# Patient Record
Sex: MALE | Race: WHITE | NOT HISPANIC OR LATINO | Employment: OTHER | URBAN - METROPOLITAN AREA
[De-identification: names, ages, dates, MRNs, and addresses within clinical notes are randomized per-mention and may not be internally consistent; named-entity substitution may affect disease eponyms.]

---

## 2023-04-26 ENCOUNTER — ANCILLARY PROCEDURE (OUTPATIENT)
Dept: GENERAL RADIOLOGY | Facility: CLINIC | Age: 48
End: 2023-04-26
Attending: PHYSICIAN ASSISTANT
Payer: COMMERCIAL

## 2023-04-26 ENCOUNTER — OFFICE VISIT (OUTPATIENT)
Dept: URGENT CARE | Facility: URGENT CARE | Age: 48
End: 2023-04-26
Payer: COMMERCIAL

## 2023-04-26 VITALS
OXYGEN SATURATION: 94 % | HEART RATE: 105 BPM | WEIGHT: 210 LBS | HEIGHT: 69 IN | DIASTOLIC BLOOD PRESSURE: 97 MMHG | RESPIRATION RATE: 14 BRPM | BODY MASS INDEX: 31.1 KG/M2 | TEMPERATURE: 97.4 F | SYSTOLIC BLOOD PRESSURE: 138 MMHG

## 2023-04-26 DIAGNOSIS — R20.2 COMPLAINT OF PARESTHESIA: ICD-10-CM

## 2023-04-26 DIAGNOSIS — R10.32 LEFT INGUINAL PAIN: Primary | ICD-10-CM

## 2023-04-26 LAB
ALBUMIN UR-MCNC: NEGATIVE MG/DL
APPEARANCE UR: CLEAR
BASOPHILS # BLD AUTO: 0 10E3/UL (ref 0–0.2)
BASOPHILS NFR BLD AUTO: 0 %
BILIRUB UR QL STRIP: NEGATIVE
COLOR UR AUTO: YELLOW
EOSINOPHIL # BLD AUTO: 0.1 10E3/UL (ref 0–0.7)
EOSINOPHIL NFR BLD AUTO: 1 %
ERYTHROCYTE [DISTWIDTH] IN BLOOD BY AUTOMATED COUNT: 13 % (ref 10–15)
GLUCOSE UR STRIP-MCNC: NEGATIVE MG/DL
HCT VFR BLD AUTO: 43.6 % (ref 40–53)
HGB BLD-MCNC: 14.7 G/DL (ref 13.3–17.7)
HGB UR QL STRIP: NEGATIVE
IMM GRANULOCYTES # BLD: 0 10E3/UL
IMM GRANULOCYTES NFR BLD: 0 %
KETONES UR STRIP-MCNC: NEGATIVE MG/DL
LEUKOCYTE ESTERASE UR QL STRIP: NEGATIVE
LYMPHOCYTES # BLD AUTO: 2.2 10E3/UL (ref 0.8–5.3)
LYMPHOCYTES NFR BLD AUTO: 23 %
MCH RBC QN AUTO: 31.7 PG (ref 26.5–33)
MCHC RBC AUTO-ENTMCNC: 33.7 G/DL (ref 31.5–36.5)
MCV RBC AUTO: 94 FL (ref 78–100)
MONOCYTES # BLD AUTO: 0.8 10E3/UL (ref 0–1.3)
MONOCYTES NFR BLD AUTO: 9 %
NEUTROPHILS # BLD AUTO: 6.4 10E3/UL (ref 1.6–8.3)
NEUTROPHILS NFR BLD AUTO: 68 %
NITRATE UR QL: NEGATIVE
PH UR STRIP: 5.5 [PH] (ref 5–7)
PLATELET # BLD AUTO: 248 10E3/UL (ref 150–450)
RBC # BLD AUTO: 4.63 10E6/UL (ref 4.4–5.9)
SP GR UR STRIP: 1.01 (ref 1–1.03)
UROBILINOGEN UR STRIP-ACNC: 0.2 E.U./DL
WBC # BLD AUTO: 9.5 10E3/UL (ref 4–11)

## 2023-04-26 PROCEDURE — 72100 X-RAY EXAM L-S SPINE 2/3 VWS: CPT | Mod: TC | Performed by: RADIOLOGY

## 2023-04-26 PROCEDURE — 36415 COLL VENOUS BLD VENIPUNCTURE: CPT

## 2023-04-26 PROCEDURE — 74019 RADEX ABDOMEN 2 VIEWS: CPT | Mod: TC | Performed by: RADIOLOGY

## 2023-04-26 PROCEDURE — 81003 URINALYSIS AUTO W/O SCOPE: CPT

## 2023-04-26 PROCEDURE — 99204 OFFICE O/P NEW MOD 45 MIN: CPT

## 2023-04-26 PROCEDURE — 85025 COMPLETE CBC W/AUTO DIFF WBC: CPT

## 2023-04-26 NOTE — PROGRESS NOTES
Left inguinal pain  - CBC with platelets and differential; Future  - XR Lumbar Spine 2/3 Views  - UA macro with reflex to Microscopic and Culture - Clinc Collect  - XR Abdomen 2 Views  - CBC with platelets and differential    Complaint of paresthesia  - CBC with platelets and differential; Future  - XR Lumbar Spine 2/3 Views  - UA macro with reflex to Microscopic and Culture - Clinc Collect  - XR Abdomen 2 Views  - CBC with platelets and differential    The etiology of the patient's symptoms is puzzling.  No hernia could be appreciated on physical exam.  It is unlikely that the patient is having a shingles flareup as there is no rash and CBC was normal.  Bowels appear to be without stool impaction or obstruction on the left-hand side.  Patient does not seem to have any evidence of renal stone.  It is probable that the patient pulled a pelvic muscle in this area.  I have advised him to use Tylenol and ibuprofen for pain relief.  Consider going to the ER in the next 48 to 72 hours if pain is worsening or does not improve.    Kevin Peters PA-C  [facility]    Subjective   47 year old who presents to clinic today for the following health issues:    Urgent Care       HPI     Left side deep pain in groin, x 5 days later tingling and burring in thigh with numbness x 10 days total. Patient states he has been taking 1 gram of Valtrex x 6 days (Patient has this for cold sores) to seek relief and tylenol. Patient denies any lumps or bumps in the area. Patient describes it as a deep throbbing pain with burning. Patient states that it radiates to the lower left back. Patient denies a rash or redness or warmth. Patient puts the pain on 5/10 scale.     Patient has had some constipation since starting the valtrex. Denies any urinary symptoms and denies pain in the penis or testicles.     Review of Systems   Review of Systems   See HPI    Objective    Temp: 97.4  F (36.3  C) Temp src: Rectal BP: (!) 138/97  Pulse: 105   Resp: 14 SpO2: 94 %       Physical Exam   Physical Exam  Constitutional:       General: He is not in acute distress.     Appearance: Normal appearance. He is normal weight. He is not ill-appearing, toxic-appearing or diaphoretic.   HENT:      Head: Normocephalic and atraumatic.   Cardiovascular:      Rate and Rhythm: Normal rate and regular rhythm.   Abdominal:      General: Abdomen is flat. Bowel sounds are normal. There is no distension.      Palpations: Abdomen is soft. There is no shifting dullness, fluid wave, hepatomegaly, splenomegaly, mass or pulsatile mass.      Tenderness: There is no abdominal tenderness. There is no right CVA tenderness, left CVA tenderness, guarding or rebound. Negative signs include Meadows's sign, Rovsing's sign, McBurney's sign, psoas sign and obturator sign.      Hernia: No hernia is present. There is no hernia in the umbilical area, left inguinal area or right inguinal area.   Genitourinary:     Pubic Area: No rash.    Musculoskeletal:      Cervical back: Normal range of motion.   Lymphadenopathy:      Cervical: No cervical adenopathy.      Lower Body: No right inguinal adenopathy. No left inguinal adenopathy.   Skin:     Findings: Rash is not crusting, macular, nodular, papular, purpuric, pustular, scaling, urticarial or vesicular.   Neurological:      General: No focal deficit present.      Mental Status: He is alert and oriented to person, place, and time. Mental status is at baseline.      Gait: Gait normal.   Psychiatric:         Mood and Affect: Mood normal.         Behavior: Behavior normal.         Thought Content: Thought content normal.         Judgment: Judgment normal.          Results for orders placed or performed in visit on 04/26/23 (from the past 24 hour(s))   UA macro with reflex to Microscopic and Culture - Clinc Collect    Specimen: Urine, Clean Catch   Result Value Ref Range    Color Urine Yellow Colorless, Straw, Light Yellow, Yellow    Appearance  Urine Clear Clear    Glucose Urine Negative Negative mg/dL    Bilirubin Urine Negative Negative    Ketones Urine Negative Negative mg/dL    Specific Gravity Urine 1.015 1.003 - 1.035    Blood Urine Negative Negative    pH Urine 5.5 5.0 - 7.0    Protein Albumin Urine Negative Negative mg/dL    Urobilinogen Urine 0.2 0.2, 1.0 E.U./dL    Nitrite Urine Negative Negative    Leukocyte Esterase Urine Negative Negative    Narrative    Microscopic not indicated   XR Lumbar Spine 2/3 Views    Narrative    XR LUMBAR SPINE 2/3 VIEWS 4/26/2023 12:14 PM    INDICATION: Left inguinal pain; Complaint of paresthesia  COMPARISON: None      Impression    IMPRESSION: 5 lumbar vertebral bodies. Slight convex right lumbar  curvature. No subluxation. Normal vertebral body heights, no fracture.  Preserved interspaces. Minor lower lumbar spine facet arthropathy.    ISABEL MIGUEL MD         SYSTEM ID:  [id number]   XR Abdomen 2 Views    Narrative    XR ABDOMEN 2 VIEWS 4/26/2023 12:15 PM    HISTORY: Left inguinal pain; Complaint of paresthesia    COMPARISON: None.      Impression    IMPRESSION: Mild stool noted throughout the colon. Nonobstructive  bowel gas pattern.    MARCELO BROUSSARD MD         SYSTEM ID:  [id number]   CBC with platelets and differential    Narrative    The following orders were created for panel order CBC with platelets and differential.  Procedure                               Abnormality         Status                     ---------                               -----------         ------                     CBC with platelets and d...[[id number]]                      Final result                 Please view results for these tests on the individual orders.   CBC with platelets and differential   Result Value Ref Range    WBC Count 9.5 4.0 - 11.0 10e3/uL    RBC Count 4.63 4.40 - 5.90 10e6/uL    Hemoglobin 14.7 13.3 - 17.7 g/dL    Hematocrit 43.6 40.0 - 53.0 %    MCV 94 78 - 100 fL    MCH 31.7 26.5 - 33.0 pg    MCHC 33.7  31.5 - 36.5 g/dL    RDW 13.0 10.0 - 15.0 %    Platelet Count 248 150 - 450 10e3/uL    % Neutrophils 68 %    % Lymphocytes 23 %    % Monocytes 9 %    % Eosinophils 1 %    % Basophils 0 %    % Immature Granulocytes 0 %    Absolute Neutrophils 6.4 1.6 - 8.3 10e3/uL    Absolute Lymphocytes 2.2 0.8 - 5.3 10e3/uL    Absolute Monocytes 0.8 0.0 - 1.3 10e3/uL    Absolute Eosinophils 0.1 0.0 - 0.7 10e3/uL    Absolute Basophils 0.0 0.0 - 0.2 10e3/uL    Absolute Immature Granulocytes 0.0 <=0.4 10e3/uL

## 2023-08-13 ENCOUNTER — HEALTH MAINTENANCE LETTER (OUTPATIENT)
Age: 48
End: 2023-08-13

## 2023-11-06 ENCOUNTER — HOSPITAL ENCOUNTER (INPATIENT)
Facility: CLINIC | Age: 48
LOS: 2 days | Discharge: HOME | End: 2023-11-08
Attending: EMERGENCY MEDICINE | Admitting: INTERNAL MEDICINE
Payer: COMMERCIAL

## 2023-11-06 ENCOUNTER — APPOINTMENT (OUTPATIENT)
Dept: CT IMAGING | Facility: CLINIC | Age: 48
End: 2023-11-06
Attending: EMERGENCY MEDICINE
Payer: COMMERCIAL

## 2023-11-06 DIAGNOSIS — A41.9 SEPSIS WITHOUT ACUTE ORGAN DYSFUNCTION, DUE TO UNSPECIFIED ORGANISM (H): ICD-10-CM

## 2023-11-06 DIAGNOSIS — K61.0 PERIANAL ABSCESS: ICD-10-CM

## 2023-11-06 LAB
ALBUMIN SERPL BCG-MCNC: 4.7 G/DL (ref 3.5–5.2)
ALBUMIN UR-MCNC: 70 MG/DL
ALP SERPL-CCNC: 78 U/L (ref 40–129)
ALT SERPL W P-5'-P-CCNC: 73 U/L (ref 0–70)
ANION GAP SERPL CALCULATED.3IONS-SCNC: 21 MMOL/L (ref 7–15)
APPEARANCE UR: CLEAR
AST SERPL W P-5'-P-CCNC: 42 U/L (ref 0–45)
BACTERIA #/AREA URNS HPF: ABNORMAL /HPF
BASOPHILS # BLD AUTO: 0.1 10E3/UL (ref 0–0.2)
BASOPHILS NFR BLD AUTO: 0 %
BILIRUB SERPL-MCNC: 1.2 MG/DL
BILIRUB UR QL STRIP: NEGATIVE
BUN SERPL-MCNC: 10.2 MG/DL (ref 6–20)
CALCIUM SERPL-MCNC: 9.5 MG/DL (ref 8.6–10)
CHLORIDE SERPL-SCNC: 94 MMOL/L (ref 98–107)
COLOR UR AUTO: YELLOW
CREAT SERPL-MCNC: 1 MG/DL (ref 0.67–1.17)
DEPRECATED HCO3 PLAS-SCNC: 21 MMOL/L (ref 22–29)
EGFRCR SERPLBLD CKD-EPI 2021: >90 ML/MIN/1.73M2
EOSINOPHIL # BLD AUTO: 0 10E3/UL (ref 0–0.7)
EOSINOPHIL NFR BLD AUTO: 0 %
ERYTHROCYTE [DISTWIDTH] IN BLOOD BY AUTOMATED COUNT: 13.2 % (ref 10–15)
GLUCOSE SERPL-MCNC: 114 MG/DL (ref 70–99)
GLUCOSE UR STRIP-MCNC: NEGATIVE MG/DL
HCO3 BLDV-SCNC: 24 MMOL/L (ref 21–28)
HCT VFR BLD AUTO: 43.1 % (ref 40–53)
HGB BLD-MCNC: 14.3 G/DL (ref 13.3–17.7)
HGB UR QL STRIP: NEGATIVE
HOLD SPECIMEN: NORMAL
HOLD SPECIMEN: NORMAL
IMM GRANULOCYTES # BLD: 0.1 10E3/UL
IMM GRANULOCYTES NFR BLD: 0 %
KETONES UR STRIP-MCNC: 100 MG/DL
LACTATE BLD-SCNC: 1.3 MMOL/L
LACTATE SERPL-SCNC: 1.7 MMOL/L (ref 0.7–2)
LEUKOCYTE ESTERASE UR QL STRIP: NEGATIVE
LYMPHOCYTES # BLD AUTO: 0.8 10E3/UL (ref 0.8–5.3)
LYMPHOCYTES NFR BLD AUTO: 5 %
MCH RBC QN AUTO: 31.4 PG (ref 26.5–33)
MCHC RBC AUTO-ENTMCNC: 33.2 G/DL (ref 31.5–36.5)
MCV RBC AUTO: 95 FL (ref 78–100)
MONOCYTES # BLD AUTO: 1.3 10E3/UL (ref 0–1.3)
MONOCYTES NFR BLD AUTO: 8 %
MUCOUS THREADS #/AREA URNS LPF: PRESENT /LPF
NEUTROPHILS # BLD AUTO: 14.5 10E3/UL (ref 1.6–8.3)
NEUTROPHILS NFR BLD AUTO: 87 %
NITRATE UR QL: NEGATIVE
NRBC # BLD AUTO: 0 10E3/UL
NRBC BLD AUTO-RTO: 0 /100
PCO2 BLDV: 33 MM HG (ref 40–50)
PH BLDV: 7.47 [PH] (ref 7.32–7.43)
PH UR STRIP: 5.5 [PH] (ref 5–7)
PLATELET # BLD AUTO: 270 10E3/UL (ref 150–450)
PO2 BLDV: 26 MM HG (ref 25–47)
POTASSIUM SERPL-SCNC: 3.6 MMOL/L (ref 3.4–5.3)
PROT SERPL-MCNC: 8.2 G/DL (ref 6.4–8.3)
RBC # BLD AUTO: 4.55 10E6/UL (ref 4.4–5.9)
RBC URINE: <1 /HPF
SAO2 % BLDV: 53 % (ref 94–100)
SODIUM SERPL-SCNC: 136 MMOL/L (ref 135–145)
SP GR UR STRIP: 1.02 (ref 1–1.03)
UROBILINOGEN UR STRIP-MCNC: NORMAL MG/DL
WBC # BLD AUTO: 16.7 10E3/UL (ref 4–11)
WBC URINE: 4 /HPF

## 2023-11-06 PROCEDURE — 250N000011 HC RX IP 250 OP 636: Performed by: EMERGENCY MEDICINE

## 2023-11-06 PROCEDURE — 83605 ASSAY OF LACTIC ACID: CPT | Performed by: EMERGENCY MEDICINE

## 2023-11-06 PROCEDURE — 85025 COMPLETE CBC W/AUTO DIFF WBC: CPT | Performed by: EMERGENCY MEDICINE

## 2023-11-06 PROCEDURE — 96365 THER/PROPH/DIAG IV INF INIT: CPT | Mod: 59

## 2023-11-06 PROCEDURE — 74177 CT ABD & PELVIS W/CONTRAST: CPT

## 2023-11-06 PROCEDURE — 120N000001 HC R&B MED SURG/OB

## 2023-11-06 PROCEDURE — 87205 SMEAR GRAM STAIN: CPT | Performed by: EMERGENCY MEDICINE

## 2023-11-06 PROCEDURE — 87077 CULTURE AEROBIC IDENTIFY: CPT | Performed by: EMERGENCY MEDICINE

## 2023-11-06 PROCEDURE — 87040 BLOOD CULTURE FOR BACTERIA: CPT | Performed by: EMERGENCY MEDICINE

## 2023-11-06 PROCEDURE — 96361 HYDRATE IV INFUSION ADD-ON: CPT

## 2023-11-06 PROCEDURE — 258N000003 HC RX IP 258 OP 636: Performed by: EMERGENCY MEDICINE

## 2023-11-06 PROCEDURE — 81001 URINALYSIS AUTO W/SCOPE: CPT | Performed by: EMERGENCY MEDICINE

## 2023-11-06 PROCEDURE — 36415 COLL VENOUS BLD VENIPUNCTURE: CPT | Performed by: EMERGENCY MEDICINE

## 2023-11-06 PROCEDURE — 82803 BLOOD GASES ANY COMBINATION: CPT

## 2023-11-06 PROCEDURE — 250N000013 HC RX MED GY IP 250 OP 250 PS 637: Performed by: EMERGENCY MEDICINE

## 2023-11-06 PROCEDURE — 99223 1ST HOSP IP/OBS HIGH 75: CPT | Mod: AI | Performed by: INTERNAL MEDICINE

## 2023-11-06 PROCEDURE — 99285 EMERGENCY DEPT VISIT HI MDM: CPT | Mod: 25

## 2023-11-06 PROCEDURE — 80053 COMPREHEN METABOLIC PANEL: CPT | Performed by: EMERGENCY MEDICINE

## 2023-11-06 PROCEDURE — 250N000009 HC RX 250: Performed by: EMERGENCY MEDICINE

## 2023-11-06 PROCEDURE — 93005 ELECTROCARDIOGRAM TRACING: CPT

## 2023-11-06 PROCEDURE — 96375 TX/PRO/DX INJ NEW DRUG ADDON: CPT

## 2023-11-06 RX ORDER — LORAZEPAM 2 MG/ML
1 INJECTION INTRAMUSCULAR ONCE
Status: COMPLETED | OUTPATIENT
Start: 2023-11-06 | End: 2023-11-06

## 2023-11-06 RX ORDER — ACETAMINOPHEN 500 MG
1000 TABLET ORAL ONCE
Status: COMPLETED | OUTPATIENT
Start: 2023-11-06 | End: 2023-11-06

## 2023-11-06 RX ORDER — AMPICILLIN AND SULBACTAM 2; 1 G/1; G/1
3 INJECTION, POWDER, FOR SOLUTION INTRAMUSCULAR; INTRAVENOUS EVERY 6 HOURS
Status: DISCONTINUED | OUTPATIENT
Start: 2023-11-07 | End: 2023-11-08

## 2023-11-06 RX ORDER — FENTANYL CITRATE 50 UG/ML
100 INJECTION, SOLUTION INTRAMUSCULAR; INTRAVENOUS
Status: DISCONTINUED | OUTPATIENT
Start: 2023-11-06 | End: 2023-11-08 | Stop reason: HOSPADM

## 2023-11-06 RX ORDER — LORAZEPAM 1 MG/1
1 TABLET ORAL ONCE
Status: DISCONTINUED | OUTPATIENT
Start: 2023-11-06 | End: 2023-11-06

## 2023-11-06 RX ORDER — PIPERACILLIN SODIUM, TAZOBACTAM SODIUM 4; .5 G/20ML; G/20ML
4.5 INJECTION, POWDER, LYOPHILIZED, FOR SOLUTION INTRAVENOUS ONCE
Status: COMPLETED | OUTPATIENT
Start: 2023-11-06 | End: 2023-11-06

## 2023-11-06 RX ORDER — HYDROMORPHONE HYDROCHLORIDE 1 MG/ML
0.5 INJECTION, SOLUTION INTRAMUSCULAR; INTRAVENOUS; SUBCUTANEOUS EVERY 30 MIN PRN
Status: DISCONTINUED | OUTPATIENT
Start: 2023-11-06 | End: 2023-11-07

## 2023-11-06 RX ORDER — IOPAMIDOL 755 MG/ML
111 INJECTION, SOLUTION INTRAVASCULAR ONCE
Status: COMPLETED | OUTPATIENT
Start: 2023-11-06 | End: 2023-11-06

## 2023-11-06 RX ORDER — PIPERACILLIN SODIUM, TAZOBACTAM SODIUM 4; .5 G/20ML; G/20ML
4.5 INJECTION, POWDER, LYOPHILIZED, FOR SOLUTION INTRAVENOUS EVERY 6 HOURS
Status: CANCELLED | OUTPATIENT
Start: 2023-11-06

## 2023-11-06 RX ADMIN — SODIUM CHLORIDE, POTASSIUM CHLORIDE, SODIUM LACTATE AND CALCIUM CHLORIDE 1000 ML: 600; 310; 30; 20 INJECTION, SOLUTION INTRAVENOUS at 18:11

## 2023-11-06 RX ADMIN — IOPAMIDOL 111 ML: 755 INJECTION, SOLUTION INTRAVENOUS at 19:31

## 2023-11-06 RX ADMIN — SODIUM CHLORIDE, POTASSIUM CHLORIDE, SODIUM LACTATE AND CALCIUM CHLORIDE 1000 ML: 600; 310; 30; 20 INJECTION, SOLUTION INTRAVENOUS at 23:16

## 2023-11-06 RX ADMIN — LORAZEPAM 1 MG: 2 INJECTION INTRAMUSCULAR; INTRAVENOUS at 21:50

## 2023-11-06 RX ADMIN — HYDROMORPHONE HYDROCHLORIDE 0.5 MG: 1 INJECTION, SOLUTION INTRAMUSCULAR; INTRAVENOUS; SUBCUTANEOUS at 18:41

## 2023-11-06 RX ADMIN — SODIUM CHLORIDE 72 ML: 9 INJECTION, SOLUTION INTRAVENOUS at 19:31

## 2023-11-06 RX ADMIN — PIPERACILLIN AND TAZOBACTAM 4.5 G: 4; .5 INJECTION, POWDER, FOR SOLUTION INTRAVENOUS at 18:15

## 2023-11-06 RX ADMIN — ACETAMINOPHEN 1000 MG: 500 TABLET, FILM COATED ORAL at 18:39

## 2023-11-06 ASSESSMENT — ACTIVITIES OF DAILY LIVING (ADL)
ADLS_ACUITY_SCORE: 35

## 2023-11-06 NOTE — ED PROVIDER NOTES
"  History     Chief Complaint:  Rectal pain     HPI   Dagoberto Almeida is a 48 year old male who presents from  for further evaluation of a perirectal abscess. He states he has been experiencing rectal pain for the past 5 days and has been taking OTC suppositories, creams, and wipes with no relief. He felt a continual worsening of pressure and bulging in his rectum. Last night, he had a fever of 103. Today, he still had a fever and he went to  and was found to have a perirectal abscess. He has been slightly nauseated and dehydrated, but denies vomiting. His urine is dark. He has been having pain with bowel movements and has been intentionally eating soft foods and smoothies to keep his stool soft. He denies black stools. He endorses tobacco and alcohol use.      Independent Historian:   None - Patient Only    Review of External Notes:   See MDM    Allergies:  The patient has no known allergies.     Medications:  The patient is currently on no regular medications.    Past Medical History:     The patient denies past medical history.      Past Surgical History:    Shoulder surgery  Pyloric stenosis repair  [place] teeth extraction    Physical Exam   Patient Vitals for the past 24 hrs:   BP Temp Temp src Pulse Resp SpO2 Height Weight   11/06/23 2100 131/83 -- -- 102 15 94 % -- --   11/06/23 2034 123/79 -- -- 110 26 96 % -- --   11/06/23 1928 -- -- -- 116 16 96 % -- --   11/06/23 1910 -- -- -- -- 20 95 % -- --   11/06/23 1900 136/72 -- -- (!) 121 -- 99 % -- --   11/06/23 1841 (!) 135/90 -- -- -- -- 98 % -- --   11/06/23 1730 (!) 133/96 -- -- (!) 133 20 100 % -- --   11/06/23 1714 (!) 145/92 (!) 100.6  F (38.1  C) Oral (!) 135 20 100 % 1.753 m (5' 9\") 99.8 kg (220 lb)        Physical Exam  Constitutional: Well developed, nontox appearance  Head: Atraumatic.   Mouth/Throat: Oropharynx is clear and moist.   Neck:  no stridor  Eyes: no scleral icterus  Cardiovascular: Regular tachycardia, 2+ R radial " pulses  Pulmonary/Chest: nml resp effort  Abdominal: ND, soft, NT, no rebound or guarding   Rectal: Posterior swelling and tenderness at approximately 6:00 perianally, no perineal tenderness or induration  : no CVA tenderness bilat  Ext: Warm, well perfused, no edema  Neurological: A&O, symmetric facies, moves ext x4  Skin: Skin is warm and dry.   Psychiatric: Behavior is normal. Thought content normal.   Nursing note and vitals reviewed.    Emergency Department Course   ECG  ECG taken at 1723, ECG read at 1727  Sinus tachycardia  Possible left atrial enlargement  Borderline ECG   No priors  Rate 133 bpm. IA interval 152 ms. QRS duration 70 ms. QT/QTc 274/407 ms. P-R-T axes 47 -14 46.     Imaging:  CT Abdomen Pelvis w Contrast   Final Result   IMPRESSION:    1.  Rim-enhancing fluid collection posterior to the distal anal canal concerning for perianal abscess. No fistula visualized, although MRI is more sensitive for detection and characterization.      2.  Hepatic steatosis.      POC US SOFT TISSUE    (Results Pending)        Laboratory:  Labs Ordered and Resulted from Time of ED Arrival to Time of ED Departure   COMPREHENSIVE METABOLIC PANEL - Abnormal       Result Value    Sodium 136      Potassium 3.6      Carbon Dioxide (CO2) 21 (*)     Anion Gap 21 (*)     Urea Nitrogen 10.2      Creatinine 1.00      GFR Estimate >90      Calcium 9.5      Chloride 94 (*)     Glucose 114 (*)     Alkaline Phosphatase 78      AST 42      ALT 73 (*)     Protein Total 8.2      Albumin 4.7      Bilirubin Total 1.2     ROUTINE UA WITH MICROSCOPIC - Abnormal    Color Urine Yellow      Appearance Urine Clear      Glucose Urine Negative      Bilirubin Urine Negative      Ketones Urine 100 (*)     Specific Gravity Urine 1.024      Blood Urine Negative      pH Urine 5.5      Protein Albumin Urine 70 (*)     Urobilinogen Urine Normal      Nitrite Urine Negative      Leukocyte Esterase Urine Negative      Bacteria Urine Few (*)     Mucus  Urine Present (*)     RBC Urine <1      WBC Urine 4     CBC WITH PLATELETS AND DIFFERENTIAL - Abnormal    WBC Count 16.7 (*)     RBC Count 4.55      Hemoglobin 14.3      Hematocrit 43.1      MCV 95      MCH 31.4      MCHC 33.2      RDW 13.2      Platelet Count 270      % Neutrophils 87      % Lymphocytes 5      % Monocytes 8      % Eosinophils 0      % Basophils 0      % Immature Granulocytes 0      NRBCs per 100 WBC 0      Absolute Neutrophils 14.5 (*)     Absolute Lymphocytes 0.8      Absolute Monocytes 1.3      Absolute Eosinophils 0.0      Absolute Basophils 0.1      Absolute Immature Granulocytes 0.1      Absolute NRBCs 0.0     ISTAT GASES LACTATE VENOUS POCT - Abnormal    Lactic Acid POCT 1.3      Bicarbonate Venous POCT 24      O2 Sat, Venous POCT 53 (*)     pCO2 Venous POCT 33 (*)     pH Venous POCT 7.47 (*)     pO2 Venous POCT 26     LACTIC ACID WHOLE BLOOD - Normal    Lactic Acid 1.7     BLOOD CULTURE   BLOOD CULTURE   AEROBIC BACTERIAL CULTURE ROUTINE          Emergency Department Course & Assessments:    Interventions:  Medications   HYDROmorphone (PF) (DILAUDID) injection 0.5 mg (0.5 mg Intravenous $Given 11/6/23 1841)   fentaNYL (PF) (SUBLIMAZE) injection 100 mcg (has no administration in time range)   lactated ringers BOLUS 1,000 mL (has no administration in time range)   lactated ringers BOLUS 1,000 mL (0 mLs Intravenous Stopped 11/6/23 1927)   piperacillin-tazobactam (ZOSYN) 4.5 g vial to attach to  mL bag (0 g Intravenous Stopped 11/6/23 1927)   acetaminophen (TYLENOL) tablet 1,000 mg (1,000 mg Oral $Given 11/6/23 1839)   Saline (72 mLs As instructed $Given 11/6/23 1931)   iopamidol (ISOVUE-370) solution 111 mL (111 mLs Intravenous $Given 11/6/23 1931)   LORazepam (ATIVAN) injection 1 mg (1 mg Intravenous $Given 11/6/23 2150)        Assessments:  1735 I obtained history and examined the patient, as noted above.   2140 I rechecked and updated the patient.  2217 I rechecked and updated the  patient.    Independent Interpretation (X-rays, CTs, rhythm strip):  See MDM    Consultations/Discussion of Management or Tests:   I spoke with Dr. Orozco from the hospitalist service regarding the patient's presentation, findings here in the ED, and plan of care.     Social Determinants of Health affecting care:   See MDM    Disposition:  The patient was admitted to the hospital under the care of Dr. Orozco.     Impression & Plan    CMS Diagnoses: None    Medical Decision Makin year old male presenting w/ perianal pain, swelling and tenderness     Social determinants affecting patient's health include: Patient Dors is alcohol and tobacco use increasing risk for complications associated with both     I reviewed medical records from urgent care office visit from today, 2023     DDx includes perirectal abscess, perianal abscess, sepsis, external hemorrhoid although unlikely given physical exam.   Labs significant for leukocytosis, normal lactic acid level.  Patient meets criteria for sepsis.  Blood cultures and Zosyn ordered for treatment.  Given concern for perirectal abscess given physical exam and sepsis, CT ordered for further evaluation.  Imaging sig for findings consistent with perianal abscess.  Reevaluate the patient with plan to I&D in the emergency department.  Upon reevaluation, the abscess had spontaneously ruptured.  A culture was ordered.  Given the patient meets sepsis criteria, he was admitted to the hospitalist service for further evaluation management.  Patient counseled on all results, disposition and diagnosis.  They are understanding and agreeable to plan. Patient admitted in guarded condition.      Diagnosis:    ICD-10-CM    1. Perianal abscess  K61.0       2. Sepsis without acute organ dysfunction, due to unspecified organism (H)  A41.9              Scribe Disclosure:  Avinash JOHNSON, am serving as a scribe at 5:32 PM on 2023 to document services personally performed by  Raymond Mcgowan MD based on my observations and the provider's statements to me.   11/6/2023   Raymond Mcgowan MD Vaughn, Christopher E, MD  11/06/23 5572

## 2023-11-06 NOTE — ED TRIAGE NOTES
Patient presents to ER after being referred by urgent care. Patient reports that for the last 5 days at home he has been managing pain with OTC for hemorrhoids but was unsuccessful. Last night developed a fever of 103 at home. Went to urgent care this morning and had a fever, sent for evaluation of perirectal abscess. Patient is tachy, diaphoretic and pale in triage.      Triage Assessment (Adult)       Row Name 11/06/23 1716          Triage Assessment    Airway WDL WDL        Respiratory WDL    Respiratory WDL WDL        Skin Circulation/Temperature WDL    Skin Circulation/Temperature WDL X;temperature     Skin Temperature warm        Cardiac WDL    Cardiac WDL X;rhythm     Pulse Rate & Regularity tachycardic        Peripheral/Neurovascular WDL    Peripheral Neurovascular WDL WDL        Cognitive/Neuro/Behavioral WDL    Cognitive/Neuro/Behavioral WDL WDL

## 2023-11-06 NOTE — ED NOTES
Rapid Assessment Note    History:   Dagoberto Almeida is a 48 year old male who presents from urgent care due to tachycardia, fever, worsening perianal pain and pressure.  Symptoms started over and been worsening over the last 5 days.  Had shaking chills last night with max temp of 103.  Thought he was having hemorrhoids and has been trying over-the-counter treatment for the last several days.  No history of it.  Has not had blood in his stool.  Was able to have a bowel movement this morning.  Is having increasing difficulty urinating.  Has not urinated since 10 AM.  States has been drinking 60 to 80 ounces of water a day.    Exam:   General:  Alert, interactive  Cardiovascular:  Well perfused  Lungs:  No respiratory distress, no accessory muscle use  Neuro:  Moving all 4 extremities  Skin:  Warm, dry  Psych:  Normal affect  GI: Perianal space not visualized in the intake    Plan of Care:   I evaluated the patient and developed an initial plan of care. I discussed this plan and explained that I, or one of my partners, would be returning to complete the evaluation.     Sepsis protocol ordered to the CT abdomen and pelvis.  Is being immediately roomed.     Morelia Yoder MD  11/06/23 [id number]

## 2023-11-07 LAB
ANION GAP SERPL CALCULATED.3IONS-SCNC: 13 MMOL/L (ref 7–15)
ATRIAL RATE - MUSE: 133 BPM
BUN SERPL-MCNC: 8.9 MG/DL (ref 6–20)
CALCIUM SERPL-MCNC: 9.1 MG/DL (ref 8.6–10)
CHLORIDE SERPL-SCNC: 103 MMOL/L (ref 98–107)
CREAT SERPL-MCNC: 0.84 MG/DL (ref 0.67–1.17)
DEPRECATED HCO3 PLAS-SCNC: 23 MMOL/L (ref 22–29)
DIASTOLIC BLOOD PRESSURE - MUSE: NORMAL MMHG
EGFRCR SERPLBLD CKD-EPI 2021: >90 ML/MIN/1.73M2
ERYTHROCYTE [DISTWIDTH] IN BLOOD BY AUTOMATED COUNT: 13 % (ref 10–15)
GLUCOSE SERPL-MCNC: 92 MG/DL (ref 70–99)
HCT VFR BLD AUTO: 39.2 % (ref 40–53)
HGB BLD-MCNC: 12.9 G/DL (ref 13.3–17.7)
INTERPRETATION ECG - MUSE: NORMAL
MCH RBC QN AUTO: 31 PG (ref 26.5–33)
MCHC RBC AUTO-ENTMCNC: 32.9 G/DL (ref 31.5–36.5)
MCV RBC AUTO: 94 FL (ref 78–100)
P AXIS - MUSE: 47 DEGREES
PLATELET # BLD AUTO: 265 10E3/UL (ref 150–450)
POTASSIUM SERPL-SCNC: 3.5 MMOL/L (ref 3.4–5.3)
PR INTERVAL - MUSE: 152 MS
QRS DURATION - MUSE: 70 MS
QT - MUSE: 274 MS
QTC - MUSE: 407 MS
R AXIS - MUSE: -14 DEGREES
RBC # BLD AUTO: 4.16 10E6/UL (ref 4.4–5.9)
SODIUM SERPL-SCNC: 139 MMOL/L (ref 135–145)
SYSTOLIC BLOOD PRESSURE - MUSE: NORMAL MMHG
T AXIS - MUSE: 46 DEGREES
VENTRICULAR RATE- MUSE: 133 BPM
WBC # BLD AUTO: 12.6 10E3/UL (ref 4–11)

## 2023-11-07 PROCEDURE — 80048 BASIC METABOLIC PNL TOTAL CA: CPT | Performed by: INTERNAL MEDICINE

## 2023-11-07 PROCEDURE — 250N000011 HC RX IP 250 OP 636: Performed by: INTERNAL MEDICINE

## 2023-11-07 PROCEDURE — 258N000003 HC RX IP 258 OP 636: Performed by: INTERNAL MEDICINE

## 2023-11-07 PROCEDURE — 85027 COMPLETE CBC AUTOMATED: CPT | Performed by: INTERNAL MEDICINE

## 2023-11-07 PROCEDURE — 96367 TX/PROPH/DG ADDL SEQ IV INF: CPT

## 2023-11-07 PROCEDURE — 99232 SBSQ HOSP IP/OBS MODERATE 35: CPT | Performed by: INTERNAL MEDICINE

## 2023-11-07 PROCEDURE — 36415 COLL VENOUS BLD VENIPUNCTURE: CPT | Performed by: INTERNAL MEDICINE

## 2023-11-07 PROCEDURE — 120N000001 HC R&B MED SURG/OB

## 2023-11-07 PROCEDURE — 96366 THER/PROPH/DIAG IV INF ADDON: CPT

## 2023-11-07 PROCEDURE — 250N000013 HC RX MED GY IP 250 OP 250 PS 637: Performed by: INTERNAL MEDICINE

## 2023-11-07 PROCEDURE — 96361 HYDRATE IV INFUSION ADD-ON: CPT

## 2023-11-07 RX ORDER — HYDROMORPHONE HCL IN WATER/PF 6 MG/30 ML
0.4 PATIENT CONTROLLED ANALGESIA SYRINGE INTRAVENOUS
Status: DISCONTINUED | OUTPATIENT
Start: 2023-11-07 | End: 2023-11-08 | Stop reason: HOSPADM

## 2023-11-07 RX ORDER — OXYCODONE HYDROCHLORIDE 5 MG/1
5 TABLET ORAL EVERY 4 HOURS PRN
Status: DISCONTINUED | OUTPATIENT
Start: 2023-11-07 | End: 2023-11-08 | Stop reason: HOSPADM

## 2023-11-07 RX ORDER — ACETAMINOPHEN 650 MG/1
650 SUPPOSITORY RECTAL EVERY 6 HOURS PRN
Status: DISCONTINUED | OUTPATIENT
Start: 2023-11-07 | End: 2023-11-08 | Stop reason: HOSPADM

## 2023-11-07 RX ORDER — HYDROMORPHONE HCL IN WATER/PF 6 MG/30 ML
0.2 PATIENT CONTROLLED ANALGESIA SYRINGE INTRAVENOUS
Status: DISCONTINUED | OUTPATIENT
Start: 2023-11-07 | End: 2023-11-08 | Stop reason: HOSPADM

## 2023-11-07 RX ORDER — ONDANSETRON 4 MG/1
4 TABLET, ORALLY DISINTEGRATING ORAL EVERY 6 HOURS PRN
Status: DISCONTINUED | OUTPATIENT
Start: 2023-11-07 | End: 2023-11-08 | Stop reason: HOSPADM

## 2023-11-07 RX ORDER — ONDANSETRON 2 MG/ML
4 INJECTION INTRAMUSCULAR; INTRAVENOUS EVERY 6 HOURS PRN
Status: DISCONTINUED | OUTPATIENT
Start: 2023-11-07 | End: 2023-11-08 | Stop reason: HOSPADM

## 2023-11-07 RX ORDER — SODIUM CHLORIDE 9 MG/ML
INJECTION, SOLUTION INTRAVENOUS CONTINUOUS
Status: ACTIVE | OUTPATIENT
Start: 2023-11-07 | End: 2023-11-08

## 2023-11-07 RX ORDER — ACETAMINOPHEN 325 MG/1
650 TABLET ORAL EVERY 6 HOURS PRN
Status: DISCONTINUED | OUTPATIENT
Start: 2023-11-07 | End: 2023-11-08 | Stop reason: HOSPADM

## 2023-11-07 RX ORDER — PROCHLORPERAZINE 25 MG
25 SUPPOSITORY, RECTAL RECTAL EVERY 12 HOURS PRN
Status: DISCONTINUED | OUTPATIENT
Start: 2023-11-07 | End: 2023-11-08 | Stop reason: HOSPADM

## 2023-11-07 RX ORDER — SODIUM CHLORIDE 9 MG/ML
INJECTION, SOLUTION INTRAVENOUS CONTINUOUS
Status: DISCONTINUED | OUTPATIENT
Start: 2023-11-07 | End: 2023-11-07

## 2023-11-07 RX ORDER — PROCHLORPERAZINE MALEATE 10 MG
10 TABLET ORAL EVERY 6 HOURS PRN
Status: DISCONTINUED | OUTPATIENT
Start: 2023-11-07 | End: 2023-11-08 | Stop reason: HOSPADM

## 2023-11-07 RX ADMIN — AMPICILLIN SODIUM AND SULBACTAM SODIUM 3 G: 2; 1 INJECTION, POWDER, FOR SOLUTION INTRAMUSCULAR; INTRAVENOUS at 12:37

## 2023-11-07 RX ADMIN — SODIUM CHLORIDE: 9 INJECTION, SOLUTION INTRAVENOUS at 03:48

## 2023-11-07 RX ADMIN — AMPICILLIN SODIUM AND SULBACTAM SODIUM 3 G: 2; 1 INJECTION, POWDER, FOR SOLUTION INTRAMUSCULAR; INTRAVENOUS at 18:40

## 2023-11-07 RX ADMIN — SODIUM CHLORIDE: 9 INJECTION, SOLUTION INTRAVENOUS at 12:37

## 2023-11-07 RX ADMIN — AMPICILLIN SODIUM AND SULBACTAM SODIUM 3 G: 2; 1 INJECTION, POWDER, FOR SOLUTION INTRAMUSCULAR; INTRAVENOUS at 23:42

## 2023-11-07 RX ADMIN — ACETAMINOPHEN 650 MG: 325 TABLET, FILM COATED ORAL at 21:43

## 2023-11-07 RX ADMIN — ACETAMINOPHEN 650 MG: 325 TABLET, FILM COATED ORAL at 04:54

## 2023-11-07 RX ADMIN — SODIUM CHLORIDE: 9 INJECTION, SOLUTION INTRAVENOUS at 23:33

## 2023-11-07 RX ADMIN — AMPICILLIN SODIUM AND SULBACTAM SODIUM 3 G: 2; 1 INJECTION, POWDER, FOR SOLUTION INTRAMUSCULAR; INTRAVENOUS at 02:14

## 2023-11-07 RX ADMIN — ACETAMINOPHEN 650 MG: 325 TABLET, FILM COATED ORAL at 15:30

## 2023-11-07 RX ADMIN — AMPICILLIN SODIUM AND SULBACTAM SODIUM 3 G: 2; 1 INJECTION, POWDER, FOR SOLUTION INTRAMUSCULAR; INTRAVENOUS at 06:36

## 2023-11-07 RX ADMIN — SODIUM CHLORIDE: 9 INJECTION, SOLUTION INTRAVENOUS at 18:40

## 2023-11-07 RX ADMIN — Medication 1 MG: at 21:52

## 2023-11-07 ASSESSMENT — ACTIVITIES OF DAILY LIVING (ADL)
ADLS_ACUITY_SCORE: 20
ADLS_ACUITY_SCORE: 35
ADLS_ACUITY_SCORE: 20
ADLS_ACUITY_SCORE: 35
ADLS_ACUITY_SCORE: 20
ADLS_ACUITY_SCORE: 21
ADLS_ACUITY_SCORE: 20

## 2023-11-07 NOTE — H&P
"[facility]    History and Physical - Hospitalist Service       Date of Admission:  11/6/2023     Assessment & Plan      Dagoberto Almeida is a 48 year old male with a history of tobacco use who is admitted on 11/6/2023 with perianal abscess.     Sepsis due to perianal abscess  CT shows Rim-enhancing fluid collection posterior to the distal anal canal concerning for perianal abscess. Sepsis as evidenced by fever, tachycardia and leukocytosis. Abscess started to drain spontaneously in the ED, purulent drainage noted.   -continue antibiotics with unasyn  -colorectal surgery consult  -clear liquid diet pending surgical eval  -pain control and antiemetics prn  -repeat CBC in the morning  -follow up abscess cultures and blood cultures  -IVF    Mild metabolic acidosis with elevated anion gap  Bicarb of 21 on admission with AG of 21. Due to sepsis and dehydration.   -continue IVF  -treat infection as above  -repeat BMP in the morning    Hepatic steatosis  Noted on CT, ALT elevated to 73  -lifestyle modification  -follow up with PCP to monitor    Nicotine dependence  About 5 cigs per day  -nicotine gum prn    Mild hyperglycemia  Glucose of 114 on BMP.   -repeat in the morning       Diet:  Clear liquids  DVT Prophylaxis: Pneumatic Compression Devices and Ambulate every shift  Code Status:  Full    Disposition: 2 days likely for IV abx    Clinically Significant Risk Factors Present on Admission             # Anion Gap Metabolic Acidosis: Highest Anion Gap = 21 mmol/L in last 2 days, will monitor and treat as appropriate               # Obesity: Estimated body mass index is 32.49 kg/m  as calculated from the following:    Height as of this encounter: 1.753 m (5' 9\").    Weight as of this encounter: 99.8 kg (220 lb).               Ziggy Orozco,   Hospitalist Service  [facility]  Securely message with [employer] (more info)  Text page via [employer] Paging/Directory "     ______________________________________________________________________    Chief Complaint   Anal pain    History is obtained from the patient and ED physician    History of Present Illness   Dagoberto Almeida is a 48 year old male who notes 5 days of anal pain that is worsening. He first noted some discomfort with wiping and felt a lump which felt like veins near his anus. He thought he had hemorroids so was using creams, supositories and wipes without improvement. The pain just intensified as did the size of the lump which also seemed to go up a bit into his buttock. He notes about 90 minutes of shaking chills on Sunday evening followed by a fever to 103F. He feels nauseated and has had poor po intake. His urine is concentrated. He is otherwise healthy. He presented to urgent care who assessed him and noted the perianal abscess and send him to the ED. Here he had a CT showing perianal abscess. He was noted to be tachycardic with a fever and leukocytosis. Before I&D at bedside could be done the abscess started draining spontaneously with some improvement in symptoms. He was started on zosyn. Pain improved with dilaudid.       Past Medical History    No past medical history on file.    Past Surgical History   No past surgical history on file.    Prior to Admission Medications   None        Review of Systems    The 10 point Review of Systems is negative other than noted in the HPI or here.     Social History   I have reviewed this patient's social history and updated it with pertinent information if needed. Smokes about 5 cigs per day, drinks most nights, about 10 drinks per week  Social History     Tobacco Use    Smoking status: Every Day     Types: Cigarettes    Smokeless tobacco: Current        Physical Exam   Vital Signs: Temp: (!) 100.6  F (38.1  C) Temp src: Oral BP: 131/83 Pulse: 102   Resp: 15 SpO2: 94 % O2 Device: None (Room air)    Weight: 220 lbs 0 oz    Gen: lying in bed, appears comfortable  CV: mildly  tachycardic  Pulm: CTAB, no wheeze  GI: +BS, soft, NT/ND. Abscess noted on the anus with purulent drainage    Medical Decision Making             Data     I have personally reviewed the following data over the past 24 hrs:    16.7 (H)  \   14.3   / 270     136 94 (L) 10.2 /  114 (H)   3.6 21 (L) 1.00 \     ALT: 73 (H) AST: 42 AP: 78 TBILI: 1.2   ALB: 4.7 TOT PROTEIN: 8.2 LIPASE: N/A     Procal: N/A CRP: N/A Lactic Acid: 1.3         Imaging results reviewed over the past 24 hrs:   Recent Results (from the past 24 hour(s))   CT Abdomen Pelvis w Contrast    Narrative    EXAM: CT ABDOMEN PELVIS W CONTRAST  LOCATION: [facility]  DATE: 11/6/2023    INDICATION: perianal pain, fever, tachycardic, decreased urination  COMPARISON: None.  TECHNIQUE: CT scan of the abdomen and pelvis was performed following injection of IV contrast. Multiplanar reformats were obtained. Dose reduction techniques were used.  CONTRAST: 111mL Isovue 370    FINDINGS:   LOWER CHEST: Normal.    HEPATOBILIARY: Diffuse hepatic steatosis. No significant mass. No bile duct dilatation. No calcified gallstones.    PANCREAS: Normal.    SPLEEN: Normal.    ADRENAL GLANDS: Normal.    KIDNEYS/BLADDER: Normal.    BOWEL: 3.4 x 2.1 x 2.2 cm rim-enhancing collection along the posterior aspect of the distal anal canal. No visualized communication with the anal canal. No upstream dilation or extraluminal gas.    LYMPH NODES: Normal.    VASCULATURE: Unremarkable.    PELVIC ORGANS: Normal.    MUSCULOSKELETAL: Tiny fat-containing paraumbilical hernia.      Impression    IMPRESSION:   1.  Rim-enhancing fluid collection posterior to the distal anal canal concerning for perianal abscess. No fistula visualized, although MRI is more sensitive for detection and characterization.    2.  Hepatic steatosis.

## 2023-11-07 NOTE — CONSULTS
Colon and Rectal Surgery Consultation         Dagoberto Almeida    MRN# [id number]   YOB: 1975 Age: 48 year old   Date of Admission: 11/6/2023  Date of Consult: 11/7/2023          Assessment and Plan:      This is a 48-year-old male presenting with concerns for perianal pain, leukocytosis, CT scan showing perianal abscess, sepsis.  Patient's perianal abscess started draining spontaneously yesterday and the pressure that brought him in has significantly improved.  Examination of the abscess shows a patent hole allowing drainage.  At this time we will recommend:    - No surgical intervention  - Continue antibiotics per primary team for management of sepsis  - We will plan to bring the patient back in clinic after discharge for wound check  - Patient does not need to pack wound    Discussed with Dr. Peggy Chino MD on 11/7/2023 at 7:51 AM            Primary Care Physician:      Millicent Naik [phone]         Requesting Physician:      MD Diana          Chief Complaint:      Perianal pain  History is obtained from the patient.         History of Present Illness:      Dagoberto Almeida is a 48 year old male with a past medical history of tobacco abuse presenting with concerns for perineal pain.  He says that the pain started 5 days ago and has been progressively getting worse.  He initially thought these were hemorrhoids and treated them with Preparation H and some suppositories.  The pain did not improve and then yesterday noticed some fevers and chills.  Given that the symptoms were not improving, he presented to the emergency department for evaluation.  In the emergency department he was noted to have a leukocytosis and tachycardia.  A CT scan was done which showed a perianal abscess in the posterior perianal area.  There was also noted to be septic with gram-positive bacteria growing from blood cultures.  Was admitted to the hospitalist service and colorectal surgery were consulted for  "evaluation.  Over the course of the night his abscess started draining and he immediately felt significant relief.  He continues to have some pain this morning although this is significantly better from last night.                Past Medical History:      No past medical history on file.          Past Surgical History:      No past surgical history on file.              Home Medications:        Prior to Admission medications    Not on File            Current Medications:          ampicillin-sulbactam  3 g Intravenous Q6H             Allergies:   No Known Allergies         Social History:        Social History     Tobacco Use    Smoking status: Every Day     Types: Cigarettes    Smokeless tobacco: Current   Substance Use Topics    Alcohol use: Not on file             Family History:      No family history on file.          Review of Systems:        The 10 point Review of Systems is negative other than noted in the HPI.            Physical Exam:      Blood pressure (!) 131/95, pulse 102, temperature 98.8  F (37.1  C), temperature source Oral, resp. rate 18, height 1.753 m (5' 9\"), weight 99.8 kg (220 lb), SpO2 95%.  Vitals:    23 1714   Weight: 99.8 kg (220 lb)     Vital Sign Ranges  Temperature Temp  Av.7  F (37.6  C)  Min: 98.8  F (37.1  C)  Max: 100.6  F (38.1  C)   Blood pressure Systolic (24hrs), Av , Min:118 , Max:145        Diastolic (24hrs), Av, Min:69, Max:96      Pulse Pulse  Av.3  Min: 94  Max: 135   Respirations Resp  Av.2  Min: 10  Max: 26   Pulse oximetry SpO2  Av.8 %  Min: 92 %  Max: 100 %         Intake/Output Summary (Last 24 hours) at 2023 0748  Last data filed at 2023 0600  Gross per 24 hour   Intake --   Output 750 ml   Net -750 ml     General: No acute distress  Cardiovascular: Not tachycardic  Lungs: Breathing unlabored  Abdomen: Soft, nontender, nondistended  Perianal examination shows a posterior abscess that has significantly drained with " surrounding erythema and a 2 x 2 millimeter hole in the middle of the abscess.  No further drainable fluid collections           Data:      All new lab and imaging data was reviewed.   Recent Labs   Lab Test 11/06/23  1740 04/26/23  1219   WBC 16.7* 9.5   HGB 14.3 14.7    248      Recent Labs   Lab Test 11/06/23  1740      POTASSIUM 3.6   CHLORIDE 94*   CO2 21*   BUN 10.2   CR 1.00   ANIONGAP 21*   LINDA 9.5   *       Tg Chino MD  [facility]

## 2023-11-07 NOTE — PLAN OF CARE
Goal Outcome Evaluation:    A&ox4, VSS on RA. Independent in room. Clear liquid diet. PIV infusing NS 125ml/hr. Received PRN tylenol for  5/10 pain. Abscess culture positive, on IV unasyn. Pending colorectal consult.  Continue to monitor. Discharge pending.

## 2023-11-07 NOTE — PHARMACY-ADMISSION MEDICATION HISTORY
Pharmacist Admission Medication History    Admission medication history is complete. The information provided in this note is only as accurate as the sources available at the time of the update.    Information Source(s): Patient via in-person    Pertinent Information: Not taking any prescriptions or over-the-counter medications at this time     Changes made to PTA medication list:  Added: None  Deleted: None  Changed: None    Allergies reviewed with patient and updates made in EHR: yes    Medication History Completed By: Maritza Bear RPH 11/6/2023 6:47 PM    No outpatient medications have been marked as taking for the 11/6/23 encounter (Hospital Encounter).

## 2023-11-07 NOTE — ED NOTES
[facility]  ED Nurse Handoff Report    ED Chief complaint: Rectal/perineal Pain and Hemorrhoids      ED Diagnosis:   Final diagnoses:   Perianal abscess   Sepsis without acute organ dysfunction, due to unspecified organism (H)       Code Status:to be discussed with inpatient MD    Allergies: No Known Allergies    Patient Story: Patient presents to ED with rectal pain for last 5 days. Had a fever of 103 this AM, referred here from urgent care.   Focused Assessment:  Tachy, diaphoretic and pale upon arrival to ED. Draining abscess near anus.     Treatments and/or interventions provided: CT, labs, abx  Patient's response to treatments and/or interventions: Patient resting in bed    To be done/followed up on inpatient unit:  See inpatient orders    Does this patient have any cognitive concerns?:  A&Ox4    Activity level - Baseline/Home:  Independent  Activity Level - Current:   Independent    Patient's Preferred language: English   Needed?: No    Isolation: None  Infection: Not Applicable  Patient tested for COVID 19 prior to admission: NO  Bariatric?: No    Vital Signs:   Vitals:    11/06/23 1910 11/06/23 1928 11/06/23 2034 11/06/23 2100   BP:   123/79 131/83   Pulse:  116 110 102   Resp: 20 16 26 15   Temp:       TempSrc:       SpO2: 95% 96% 96% 94%   Weight:       Height:           Cardiac Rhythm:Cardiac Rhythm: Sinus tachycardia    Was the PSS-3 completed:   Yes  What interventions are required if any?               Family Comments: NA  OBS brochure/video discussed/provided to patient/family: No              Name of person given brochure if not patient: NA              Relationship to patient: NA    For the majority of the shift this patient's behavior was Green.   Behavioral interventions performed were None.    ED NURSE PHONE NUMBER: [id number]

## 2023-11-07 NOTE — PROGRESS NOTES
"[facility]    Medicine Progress Note - Hospitalist Service        Date of Admission:  11/6/2023  5:12 PM    Assessment & Plan:   Dagoberto Almeida is a 48 year old male with a history of tobacco use who is admitted on 11/6/2023 with perianal abscess.      Sepsis due to perianal abscess  -CT shows Rim-enhancing fluid collection posterior to the distal anal canal concerning for perianal abscess. Sepsis as evidenced by fever, tachycardia and leukocytosis. Abscess started to drain spontaneously in the ED, purulent drainage noted.   -Continue IV Unasyn  -Evaluated by colorectal surgery, given spontaneous drainage of the abscess, no plans for surgical intervention  -Wound culture growing gram-positive cocci and gram-positive bacilli resembling diphtheroids, await final culture  -Diet as tolerated  -Anticipate home tomorrow on oral antibiotics if clinically doing better    Mild metabolic acidosis with elevated anion gap  Bicarb of 21 on admission with AG of 21. Due to sepsis and dehydration.   -continue IVF  -treat infection as above  -repeat BMP this morning      Hepatic steatosis  Noted on CT, ALT elevated to 73  -lifestyle modification  -follow up with PCP to monitor     Nicotine dependence  About 5 cigs per day  -nicotine gum prn     Mild hyperglycemia  Glucose of 114 on BMP.   -repeat in the morning    Diet: Regular Diet Adult     DVT Prophylaxis: Pneumatic Compression Devices   Perez Catheter: Not present  Code Status: Full Code     Disposition Plan       Expected Discharge Date: 11/08/2023              Entered: Garcia Ortiz MD 11/07/2023, 9:52 AM        Clinically Significant Risk Factors Present on Admission             # Anion Gap Metabolic Acidosis: Highest Anion Gap = 21 mmol/L in last 2 days, will monitor and treat as appropriate           # Obesity: Estimated body mass index is 32.49 kg/m  as calculated from the following:    Height as of this encounter: 1.753 m (5' 9\").    Weight as of " "this encounter: 99.8 kg (220 lb).                   The patient's care was discussed with the Bedside Nurse and Patient.    Medical Decision Making       **CLEAR ALL SELECTIONS**      Labs/Imaging Reviewed:  See Information above and Data section below  Time SPENT BY ME on the date of service doing chart review, history, exam, documentation & further activities per the note:  35 MINUTES    Chart documentation was completed, in part, with [employer] voice-recognition software. Even though reviewed, some grammatical, spelling, and word errors may remain.    Garcia Ortiz MD  Hospitalist Service  [facility]  Text Page 7AM-6PM  Securely message with the Vocera Web Console (learn more here)  Text page via [employer] Paging/Directory    ______________________________________________________________________    Interval History       Data reviewed today: I reviewed all medications, new labs and imaging results over the last 24 hours. I personally reviewed no images or EKG's today.    Physical Exam   Vital signs:  Temp: 98.7  F (37.1  C) Temp src: Oral BP: 127/87 Pulse: 90   Resp: 19 SpO2: 96 % O2 Device: None (Room air)   Height: 175.3 cm (5' 9\") Weight: 99.8 kg (220 lb)  Estimated body mass index is 32.49 kg/m  as calculated from the following:    Height as of this encounter: 1.753 m (5' 9\").    Weight as of this encounter: 99.8 kg (220 lb).      Wt Readings from Last 2 Encounters:   11/06/23 99.8 kg (220 lb)   04/26/23 95.3 kg (210 lb)       Gen: AAOX3, NAD  Resp: CTA B/L, normal WOB  CVS: RRR, no murmur  Abd/GI: Soft, non-tender. BS- normoactive.  External perianal exam shows a posterior abscess with minimal drainage with surrounding erythema and some induration  Skin: Warm, dry no rashes  MSK:  no pedal edema  Neuro- CN- intact. No focal deficits.        Data   Recent Labs   Lab 11/06/23  1740   WBC 16.7*   HGB 14.3   MCV 95         POTASSIUM 3.6   CHLORIDE 94*   CO2 21*   BUN 10.2   CR " 1.00   ANIONGAP 21*   LINDA 9.5   *   ALBUMIN 4.7   PROTTOTAL 8.2   BILITOTAL 1.2   ALKPHOS 78   ALT 73*   AST 42       Recent Results (from the past 24 hour(s))   CT Abdomen Pelvis w Contrast    Narrative    EXAM: CT ABDOMEN PELVIS W CONTRAST  LOCATION: [facility]  DATE: 11/6/2023    INDICATION: perianal pain, fever, tachycardic, decreased urination  COMPARISON: None.  TECHNIQUE: CT scan of the abdomen and pelvis was performed following injection of IV contrast. Multiplanar reformats were obtained. Dose reduction techniques were used.  CONTRAST: 111mL Isovue 370    FINDINGS:   LOWER CHEST: Normal.    HEPATOBILIARY: Diffuse hepatic steatosis. No significant mass. No bile duct dilatation. No calcified gallstones.    PANCREAS: Normal.    SPLEEN: Normal.    ADRENAL GLANDS: Normal.    KIDNEYS/BLADDER: Normal.    BOWEL: 3.4 x 2.1 x 2.2 cm rim-enhancing collection along the posterior aspect of the distal anal canal. No visualized communication with the anal canal. No upstream dilation or extraluminal gas.    LYMPH NODES: Normal.    VASCULATURE: Unremarkable.    PELVIC ORGANS: Normal.    MUSCULOSKELETAL: Tiny fat-containing paraumbilical hernia.      Impression    IMPRESSION:   1.  Rim-enhancing fluid collection posterior to the distal anal canal concerning for perianal abscess. No fistula visualized, although MRI is more sensitive for detection and characterization.    2.  Hepatic steatosis.     Medications    sodium chloride 125 mL/hr at 11/07/23 0400      ampicillin-sulbactam  3 g Intravenous Q6H

## 2023-11-07 NOTE — PROGRESS NOTES
RECEIVING UNIT ED HANDOFF REVIEW    ED Nurse Handoff Report was reviewed by: Denny Galvin RN on November 7, 2023 at 2:42 AM

## 2023-11-07 NOTE — PROVIDER NOTIFICATION
MD Notification    Notified Person: MD    Notified Person Name:Kalpesh Lizama     Notification Date/Time: 11/7/23 5:15am     Notification Interaction:[employer] web     Purpose of Notification: Stat lab     B.M     5950     Stat lab result. Perianal abscess culture positive: 4+ gram positive cocci, 2+ gram positive bacilli resembling diphtheroids, +4 WBC.     Currently on scheduled q6 3g IV Unasyn.     Orders Received: Pending     Comments:

## 2023-11-07 NOTE — UTILIZATION REVIEW
Admission Status; Secondary Review Determination         Under the authority of the Utilization Management Committee, the utilization review process indicated a secondary review on the above patient.  The review outcome is based on review of the medical records, discussions with staff, and applying clinical experience noted on the date of the review.        (x)      Inpatient Status Appropriate - This patient's medical care is consistent with medical management for inpatient care and reasonable inpatient medical practice.     RATIONALE FOR DETERMINATION   The patient is a 48-year-old male admitted on 11/6/2023.  Patient came to the ED because of a perianal abscess which began to drain spontaneously.  The patient had signs of sepsis with fever on admission of 100.6, white blood cell count of 16.7 and currently 12.6, tachycardia up to 135, purulent drainage with gram-positive cocci and gram-negative diphtheroids on Gram stain.  Patient was started on Unasyn and will be in the hospital least an additional midnight stay.  Based on systemic findings for sepsis and need for additional IV antibiotic, agree with current inpatient status is appropriate.      The severity of illness, intensity of service provided, expected LOS and risk for adverse outcome make the care complex, high risk and appropriate for hospital admission.        The information on this document is developed by the utilization review team in order for the business office to ensure compliance.  This only denotes the appropriateness of proper admission status and does not reflect the quality of care rendered.         The definitions of Inpatient Status and Observation Status used in making the determination above are those provided in the CMS Coverage Manual, Chapter 1 and Chapter 6, section 70.4.      Sincerely,     Zac Núñez MD  Physician Advisor  Utilization Review/ Case Management  [facility].

## 2023-11-08 VITALS
HEART RATE: 77 BPM | BODY MASS INDEX: 32.58 KG/M2 | WEIGHT: 220 LBS | SYSTOLIC BLOOD PRESSURE: 130 MMHG | TEMPERATURE: 97.9 F | HEIGHT: 69 IN | OXYGEN SATURATION: 98 % | RESPIRATION RATE: 16 BRPM | DIASTOLIC BLOOD PRESSURE: 91 MMHG

## 2023-11-08 LAB
ANION GAP SERPL CALCULATED.3IONS-SCNC: 10 MMOL/L (ref 7–15)
BUN SERPL-MCNC: 9 MG/DL (ref 6–20)
CALCIUM SERPL-MCNC: 8.4 MG/DL (ref 8.6–10)
CHLORIDE SERPL-SCNC: 108 MMOL/L (ref 98–107)
CREAT SERPL-MCNC: 0.81 MG/DL (ref 0.67–1.17)
DEPRECATED HCO3 PLAS-SCNC: 23 MMOL/L (ref 22–29)
EGFRCR SERPLBLD CKD-EPI 2021: >90 ML/MIN/1.73M2
ERYTHROCYTE [DISTWIDTH] IN BLOOD BY AUTOMATED COUNT: 12.9 % (ref 10–15)
GLUCOSE SERPL-MCNC: 104 MG/DL (ref 70–99)
HCT VFR BLD AUTO: 35 % (ref 40–53)
HGB BLD-MCNC: 11.5 G/DL (ref 13.3–17.7)
MCH RBC QN AUTO: 31.2 PG (ref 26.5–33)
MCHC RBC AUTO-ENTMCNC: 32.9 G/DL (ref 31.5–36.5)
MCV RBC AUTO: 95 FL (ref 78–100)
PLATELET # BLD AUTO: 229 10E3/UL (ref 150–450)
POTASSIUM SERPL-SCNC: 3.8 MMOL/L (ref 3.4–5.3)
RBC # BLD AUTO: 3.69 10E6/UL (ref 4.4–5.9)
SODIUM SERPL-SCNC: 141 MMOL/L (ref 135–145)
WBC # BLD AUTO: 7.5 10E3/UL (ref 4–11)

## 2023-11-08 PROCEDURE — 250N000013 HC RX MED GY IP 250 OP 250 PS 637: Performed by: INTERNAL MEDICINE

## 2023-11-08 PROCEDURE — 99239 HOSP IP/OBS DSCHRG MGMT >30: CPT | Performed by: INTERNAL MEDICINE

## 2023-11-08 PROCEDURE — 85027 COMPLETE CBC AUTOMATED: CPT | Performed by: INTERNAL MEDICINE

## 2023-11-08 PROCEDURE — 250N000013 HC RX MED GY IP 250 OP 250 PS 637: Performed by: COLON & RECTAL SURGERY

## 2023-11-08 PROCEDURE — 250N000011 HC RX IP 250 OP 636: Performed by: INTERNAL MEDICINE

## 2023-11-08 PROCEDURE — 80048 BASIC METABOLIC PNL TOTAL CA: CPT | Performed by: INTERNAL MEDICINE

## 2023-11-08 PROCEDURE — 36415 COLL VENOUS BLD VENIPUNCTURE: CPT | Performed by: INTERNAL MEDICINE

## 2023-11-08 RX ORDER — POLYETHYLENE GLYCOL 3350 17 G/17G
17 POWDER, FOR SOLUTION ORAL DAILY
COMMUNITY
Start: 2023-11-08

## 2023-11-08 RX ORDER — ACETAMINOPHEN 325 MG/1
650 TABLET ORAL EVERY 6 HOURS PRN
COMMUNITY
Start: 2023-11-08

## 2023-11-08 RX ADMIN — AMPICILLIN SODIUM AND SULBACTAM SODIUM 3 G: 2; 1 INJECTION, POWDER, FOR SOLUTION INTRAMUSCULAR; INTRAVENOUS at 05:53

## 2023-11-08 RX ADMIN — AMOXICILLIN AND CLAVULANATE POTASSIUM 1 TABLET: 875; 125 TABLET, FILM COATED ORAL at 10:31

## 2023-11-08 RX ADMIN — ACETAMINOPHEN 650 MG: 325 TABLET, FILM COATED ORAL at 11:15

## 2023-11-08 ASSESSMENT — ACTIVITIES OF DAILY LIVING (ADL)
ADLS_ACUITY_SCORE: 20

## 2023-11-08 NOTE — DISCHARGE SUMMARY
[facility]    Discharge Summary  Hospitalist    Date of Admission:  11/6/2023  Date of Discharge:  11/8/2023  Discharging Provider: Garcia Ortiz MD, MD    Discharge Diagnoses      Sepsis due to perianal abscess s/p spontaneous drainage  Mild metabolic acidosis with elevated anion gap-resolved  Hepatic steatosis  Nicotine dependence  Mild hyperglycemia    Hospital Course:    Dagoberto Almeida is a 48 year old male with a history of tobacco use who is admitted on 11/6/2023 with perianal abscess.      Sepsis due to perianal abscess  -CT shows Rim-enhancing fluid collection posterior to the distal anal canal concerning for perianal abscess. Sepsis as evidenced by fever, tachycardia and leukocytosis. Abscess started to drain spontaneously in the ED, purulent drainage noted.   -Evaluated by colorectal surgery, given spontaneous drainage of the abscess, no plans for surgical intervention  -Wound culture growing strep agalactiae and gram-positive bacilli resembling diphtheroids  -Blood culture negative, leukocyte count improved to normal.   -Clinically much better.  Cleared by colorectal surgery for discharge.  Transition to oral Augmentin for 1 week.  Follow-up with colorectal surgery in 1 to 2 weeks.  Avoid constipation.     Mild metabolic acidosis with elevated anion gap  Bicarb of 21 on admission with AG of 21. Due to sepsis and dehydration.   -continue IVF  -treat infection as above  -Resolved with treatment of infection and IV fluids.     Hepatic steatosis  Noted on CT, ALT elevated to 73  -lifestyle modification  -follow up with PCP to monitor     Nicotine dependence  About 5 cigs per day  -nicotine gum prn     Mild hyperglycemia  Glucose of 114 on BMP.   -repeat stable, follow-up with PCP    Garcia Ortiz MD, MD    Significant Results and Procedures   See below    Pending Results     Unresulted Labs Ordered in the Past 30 Days of this Admission       Date and Time Order Name Status  Description    11/6/2023 10:32 PM Abscess Aerobic Bacterial Culture Routine with Gram Stain Preliminary     11/6/2023  5:22 PM Blood Culture Peripheral Blood Preliminary     11/6/2023  5:22 PM Blood Culture Peripheral Blood Preliminary             Code Status   Full Code       Primary Care Physician   MAT DOCKERY    Physical Exam   Temp: 97.9  F (36.6  C) Temp src: Oral BP: (!) 130/91 Pulse: 77   Resp: 16 SpO2: 98 % O2 Device: None (Room air)      Constitutional: AAOX3, NAD  Respiratory: CTA B/L, Normal WOB  Cardiovascular: RRR, No murmur  GI: Soft, Non- tender, BS- normoactive  Neuro: CN- grossly intact     Discharge Disposition   Discharged to home  Condition at discharge: Stable    Consultations This Hospital Stay   COLORECTAL SURGERY IP CONSULT    Time Spent on this Encounter   IGarcia MD, personally saw the patient today and spent greater than 30 minutes discharging this patient.    Discharge Orders      Reason for your hospital stay    Perianal abscess     Follow-up and recommended labs and tests     Follow up with primary care provider, MAT DOCKERY, within 7 days for hospital follow- up.  Colorectal surgery in 1-2 weeks     Activity    Your activity upon discharge: activity as tolerated     Discharge Instructions    Shower twice daily to clean the area with warm soapy water.  No packing was required.  Discussed he may have ongoing drainage for approximately 2 to 3 weeks until the area is fully healed     Diet    Follow this diet upon discharge: Orders Placed This Encounter      Regular Diet Adult     Discharge Medications   Current Discharge Medication List        START taking these medications    Details   acetaminophen (TYLENOL) 325 MG tablet Take 2 tablets (650 mg) by mouth every 6 hours as needed for mild pain or other (and adjunct with moderate or severe pain or per patient request)    Associated Diagnoses: Perianal abscess      amoxicillin-clavulanate (AUGMENTIN) 875-125 MG tablet  Take 1 tablet by mouth every 12 hours  Qty: 13 tablet, Refills: 0    Associated Diagnoses: Perianal abscess      polyethylene glycol (MIRALAX) 17 GM/Dose powder Take 17 g by mouth daily    Associated Diagnoses: Perianal abscess           Allergies   No Known Allergies  Data   Most Recent 3 CBC's:  Recent Labs   Lab Test 11/08/23  0717 11/07/23  1128 11/06/23  1740   WBC 7.5 12.6* 16.7*   HGB 11.5* 12.9* 14.3   MCV 95 94 95    265 270      Most Recent 3 BMP's:  Recent Labs   Lab Test 11/08/23  0717 11/07/23  1128 11/06/23  1740    139 136   POTASSIUM 3.8 3.5 3.6   CHLORIDE 108* 103 94*   CO2 23 23 21*   BUN 9.0 8.9 10.2   CR 0.81 0.84 1.00   ANIONGAP 10 13 21*   LINDA 8.4* 9.1 9.5   * 92 114*     Most Recent 2 LFT's:  Recent Labs   Lab Test 11/06/23  1740   AST 42   ALT 73*   ALKPHOS 78   BILITOTAL 1.2     Most Recent INR's and Anticoagulation Dosing History:  Anticoagulation Dose History           No data to display              Most Recent 3 Troponin's:No lab results found.  Most Recent Cholesterol Panel:No lab results found.  Most Recent 6 Bacteria Isolates From Any Culture (See EPIC Reports for Culture Details):No lab results found.  Most Recent TSH, T4 and A1c Labs:No lab results found.    Results for orders placed or performed during the hospital encounter of 11/06/23   CT Abdomen Pelvis w Contrast    Narrative    EXAM: CT ABDOMEN PELVIS W CONTRAST  LOCATION: [facility]  DATE: 11/6/2023    INDICATION: perianal pain, fever, tachycardic, decreased urination  COMPARISON: None.  TECHNIQUE: CT scan of the abdomen and pelvis was performed following injection of IV contrast. Multiplanar reformats were obtained. Dose reduction techniques were used.  CONTRAST: 111mL Isovue 370    FINDINGS:   LOWER CHEST: Normal.    HEPATOBILIARY: Diffuse hepatic steatosis. No significant mass. No bile duct dilatation. No calcified gallstones.    PANCREAS: Normal.    SPLEEN: Normal.    ADRENAL  GLANDS: Normal.    KIDNEYS/BLADDER: Normal.    BOWEL: 3.4 x 2.1 x 2.2 cm rim-enhancing collection along the posterior aspect of the distal anal canal. No visualized communication with the anal canal. No upstream dilation or extraluminal gas.    LYMPH NODES: Normal.    VASCULATURE: Unremarkable.    PELVIC ORGANS: Normal.    MUSCULOSKELETAL: Tiny fat-containing paraumbilical hernia.      Impression    IMPRESSION:   1.  Rim-enhancing fluid collection posterior to the distal anal canal concerning for perianal abscess. No fistula visualized, although MRI is more sensitive for detection and characterization.    2.  Hepatic steatosis.

## 2023-11-08 NOTE — PLAN OF CARE
Oriented x4. VSS but T max 99.8 axillary.  Endorses headache, tylenol given.  Minimal pain at site of abscess.  Ambulating independently in room.  Good appetite on regular diet.  IV fluids infusing and intermittent unasyn.  BM x1 today.  Using taqueria bottle to cleanse perianal abscess site.  Loose BM x2.  Discharge plan pending labs.  Nursing will continue to monitor.

## 2023-11-08 NOTE — PROGRESS NOTES
COLON & RECTAL SURGERY  PROGRESS NOTE    November 8, 2023    SUBJECTIVE: Perianal pain improving.  Abdomen continues to drain.  Tolerating a diet.    OBJECTIVE:  Temp:  [98.6  F (37  C)-99.8  F (37.7  C)] 98.6  F (37  C)  Pulse:  [78-98] 78  Resp:  [16-19] 16  BP: (115-137)/(81-95) 115/81  SpO2:  [96 %] 96 %    Intake/Output Summary (Last 24 hours) at 11/8/2023 0737  Last data filed at 11/8/2023 0555  Gross per 24 hour   Intake 2802 ml   Output --   Net 2802 ml       GENERAL:  Awake, alert, no acute distress  EXTREMITIES: Warm and well perfused, no edema   PERINEUM: Posterior midline abscess with spontaneous drainage.  Opening is about a 1 cm in size purulent drainage.  No surrounding erythema or fluctuance to suggest undrained abscess    LABS:  Lab Results   Component Value Date    WBC 12.6 11/07/2023     Lab Results   Component Value Date    HGB 12.9 11/07/2023     Lab Results   Component Value Date    HCT 39.2 11/07/2023     Lab Results   Component Value Date     11/07/2023     Last Basic Metabolic Panel:  Lab Results   Component Value Date     11/07/2023      Lab Results   Component Value Date    POTASSIUM 3.5 11/07/2023     Lab Results   Component Value Date    CHLORIDE 103 11/07/2023     Lab Results   Component Value Date    LINDA 9.1 11/07/2023     Lab Results   Component Value Date    CO2 23 11/07/2023     Lab Results   Component Value Date    BUN 8.9 11/07/2023     Lab Results   Component Value Date    CR 0.84 11/07/2023     Lab Results   Component Value Date    GLC 92 11/07/2023       ASSESSMENT/PLAN: Dagoberto Almeida is a 48 year old male admitted with a posterior midline perianal abscess that has spontaneously drained.  Blood cultures are no growth to date.  Wound cultures are pending at this time.    Regular diet.  MiraLAX as needed for constipation.  Transition to oral antibiotics today.  We will plan for a 1 week course of antibiotics with Augmentin.  Shower twice daily to clean the area  with warm soapy water.  No packing was required.  Discussed he may have ongoing drainage for approximately 2 to 3 weeks until the area is fully healed  My office will arrange follow-up in 1 to 2 weeks for a wound check.  Okay to discharge home today from a colorectal surgery standpoint    For questions/paging, please contact the CRS office at [phone].    Maritza Woods MD  Colorectal Surgery    [facility]  [address] [address]  T: [phone]  F: [phone]

## 2023-11-08 NOTE — PROGRESS NOTES
[id number]  Patient AOX4. VSS on RA. Afebrile.Pt ambulating Independent. Pain managed with tylenol. Voiding in bathroom. BS audible/BM on shift. Diet Reg. PIV infusing NS@75ml/hr. Continue plan of care. Plan for discharge pending labs.

## 2023-11-08 NOTE — DISCHARGE INSTRUCTIONS
Transition to oral antibiotics today.  We will plan for a 1 week course of antibiotics with Augmentin.  Shower twice daily to clean the area with warm soapy water.  No packing was required.  Discussed there may be ongoing drainage for approximately 2 to 3 weeks until the area is fully healed.   Colorectal office will arrange follow-up in 1 to 2 weeks for a wound check.

## 2023-11-09 ENCOUNTER — PATIENT OUTREACH (OUTPATIENT)
Dept: CARE COORDINATION | Facility: CLINIC | Age: 48
End: 2023-11-09
Payer: COMMERCIAL

## 2023-11-09 NOTE — PROGRESS NOTES
[facility]    Background: Transitional Care Management program identified per system criteria and reviewed by [facility] team for possible outreach.    Assessment: Upon chart review, [facility] Team member will not proceed with patient outreach related to this episode of Transitional Care Management program due to reason below:    Patient has a follow up appointment with an appropriate provider today for hospital discharge    Patient has an appointment today at their primary care clinic, [facility], with their Primary Care Provider. No W outreach call needed at this time.    Plan: Transitional Care Management episode addressed appropriately per reason noted above.      [facility]  [phone]  [facility]     *Connected Care Resource Team does NOT follow patient ongoing. Referrals are identified based on internal discharge reports and the outreach is to ensure patient has an understanding of their discharge instructions.

## 2023-11-10 LAB
BACTERIA ABSC ANAEROBE+AEROBE CULT: ABNORMAL
GRAM STAIN RESULT: ABNORMAL

## 2023-11-11 LAB
BACTERIA BLD CULT: NO GROWTH
BACTERIA BLD CULT: NO GROWTH

## 2024-10-06 ENCOUNTER — HEALTH MAINTENANCE LETTER (OUTPATIENT)
Age: 49
End: 2024-10-06